# Patient Record
Sex: MALE | Race: WHITE
[De-identification: names, ages, dates, MRNs, and addresses within clinical notes are randomized per-mention and may not be internally consistent; named-entity substitution may affect disease eponyms.]

---

## 2022-07-28 ENCOUNTER — HOSPITAL ENCOUNTER (EMERGENCY)
Dept: HOSPITAL 26 - MED | Age: 37
Discharge: HOME | End: 2022-07-28
Payer: COMMERCIAL

## 2022-07-28 VITALS — HEIGHT: 70 IN | WEIGHT: 180 LBS | BODY MASS INDEX: 25.77 KG/M2

## 2022-07-28 DIAGNOSIS — U07.1: Primary | ICD-10-CM

## 2022-07-28 NOTE — NUR
38 Y/O MALE BIB MOTHER FOR SUBJECTIVE FEVER AND BODY CHILLS.  PT HAD A POSITIVE 
HOME TEST AND WOULD LIKE TO BE TESTED HERE. 



PMHX NONE

ALLERGIES: DENIES

HOME MEDS: DENIES